# Patient Record
(demographics unavailable — no encounter records)

---

## 2024-12-19 NOTE — HISTORY OF PRESENT ILLNESS
[FreeTextEntry6] :  Fourteen year old female brought to the office because of bad earache that started last night. Had a "cold" last week with fever that lasted few days and congestion. Fever subsided and was getting better until last night when she woke up with earache.

## 2024-12-19 NOTE — PHYSICAL EXAM
[Clear] : right tympanic membrane not clear [Erythema] : erythema [Bulging] : bulging [Purulent Effusion] : purulent effusion [Mucoid Discharge] : mucoid discharge [Inflamed Nasal Mucosa] : inflamed nasal mucosa [NL] : warm, clear

## 2024-12-19 NOTE — DISCUSSION/SUMMARY
[FreeTextEntry1] :  Fourteen year old female with Right Acute Otitis media/ URI. Augmentin 875 mg bid X 10 days prescribed .Complete antibiotic course. Potential side effect of antibiotics includes but not limited to diarrhea. Provide ibuprofen as needed for pain or fever. If no improvement within 48 hours return for re-evaluation. Follow up in 2-3 wks for tympanometry.

## 2025-01-13 NOTE — DISCUSSION/SUMMARY
[FreeTextEntry1] :  Fourteen year old female with resolved otitis. No need for further medications. Routine labs will be done today ( missed appointment last time).

## 2025-01-13 NOTE — HISTORY OF PRESENT ILLNESS
[FreeTextEntry6] :  14 year girl here for follow up of AOM. She  completed antibiotic course. She  has no ear pain. She has no fever. She has no difficulty with sleep.

## 2025-06-12 NOTE — DISCUSSION/SUMMARY
[FreeTextEntry1] : strep test done  Patient likely with viral pharyngitis. Rapid strep perfromed in office is negative. Will send throat culture to rule out strep. Recommend supportive care with antipyretics, salt water gargles, and if age-appropriate throat lozenges.

## 2025-06-12 NOTE — HISTORY OF PRESENT ILLNESS
[de-identified] : patient with fever, sore throat and three episodes of vomiting  [FreeTextEntry6] : tylenol